# Patient Record
Sex: MALE | Race: WHITE | Employment: FULL TIME | URBAN - METROPOLITAN AREA
[De-identification: names, ages, dates, MRNs, and addresses within clinical notes are randomized per-mention and may not be internally consistent; named-entity substitution may affect disease eponyms.]

---

## 2020-03-06 ENCOUNTER — HOSPITAL ENCOUNTER (OUTPATIENT)
Dept: PREADMISSION TESTING | Age: 40
Discharge: HOME | End: 2020-03-10
Payer: COMMERCIAL

## 2020-03-06 VITALS
TEMPERATURE: 98.2 F | HEIGHT: 71 IN | HEART RATE: 68 BPM | BODY MASS INDEX: 26.42 KG/M2 | DIASTOLIC BLOOD PRESSURE: 72 MMHG | WEIGHT: 188.71 LBS | SYSTOLIC BLOOD PRESSURE: 130 MMHG | RESPIRATION RATE: 18 BRPM | OXYGEN SATURATION: 98 %

## 2020-03-06 RX ORDER — OXYCODONE HCL 10 MG/1
5 TABLET, FILM COATED, EXTENDED RELEASE ORAL EVERY 4 HOURS PRN
COMMUNITY

## 2020-03-06 RX ORDER — SODIUM CHLORIDE, SODIUM LACTATE, POTASSIUM CHLORIDE, CALCIUM CHLORIDE 600; 310; 30; 20 MG/100ML; MG/100ML; MG/100ML; MG/100ML
1000 INJECTION, SOLUTION INTRAVENOUS CONTINUOUS
Status: CANCELLED | OUTPATIENT
Start: 2020-03-06

## 2020-03-06 ASSESSMENT — PAIN DESCRIPTION - ORIENTATION: ORIENTATION: LEFT

## 2020-03-06 ASSESSMENT — PAIN DESCRIPTION - FREQUENCY: FREQUENCY: CONTINUOUS

## 2020-03-06 ASSESSMENT — PAIN DESCRIPTION - PAIN TYPE: TYPE: CHRONIC PAIN

## 2020-03-06 ASSESSMENT — PAIN SCALES - GENERAL: PAINLEVEL_OUTOF10: 6

## 2020-03-06 ASSESSMENT — PAIN DESCRIPTION - LOCATION: LOCATION: SHOULDER

## 2020-03-06 NOTE — H&P
History and Physical    Pt Name: Lyndsay Strong  MRN: [id number]  YOB: 1980  Date of evaluation: 3/6/2020    SUBJECTIVE:     History of Chief Complaint:    Patient complains of neck, left shoulder and left arm pain for about four months. He denies a specific injury to his neck but reports he slept wrong on the couch the night before the pain started. He reports his insurance will not cover physical therapy. He has been scheduled for anterior cervical decompression with fusion. Past Medical History    has a past medical history of Arthritis, Headache, Herniated disc, cervical, Pain, Snores, Wears glasses, and Wellness examination. Past Surgical History   has a past surgical history that includes lipoma resection (N/A, 2013). Medications    Current Outpatient Medications:     oxyCODONE (OXYCONTIN) 10 MG extended release tablet, Take 5 mg by mouth every 4 hours as needed for Pain., Disp: , Rfl:     NONFORMULARY, Take 1 tablet by mouth every 3 hours as needed Elderberry, Disp: , Rfl:     ibuprofen (ADVIL;MOTRIN) 800 MG tablet, Take 1 tablet by mouth every 8 hours as needed for Pain (with food). , Disp: 90 tablet, Rfl: 0  Allergies  is allergic to naproxen. Family History  family history includes Heart Disease in his father. Social History   reports that he has been smoking cigarettes. He has been smoking about 0.75 packs per day. He has never used smokeless tobacco.   reports current alcohol use of about 12.0 standard drinks of alcohol per week. reports current drug use. Drug: Marijuana.     Marital Status: engaged  Children: five children  Occupation:     Review of Systems  CONSTITUTIONAL:  negative   EYES:  glasses  HENT:  negative   RESPIRATORY:  cough with sputum  CARDIOVASCULAR:  negative   GASTROINTESTINAL:  negative   GENITOURINARY:  negative   INTEGUMENT/BREAST:  negative   HEMATOLOGIC/LYMPHATIC:  negative   ALLERGIC/IMMUNOLOGIC:  drug reactions  ENDOCRINE:  negative MUSCULOSKELETAL:  myalgias, arthralgias and decreased range of motion  NEUROLOGICAL:  weakness, numbness and tingling  BEHAVIOR/PSYCH:  negative     OBJECTIVE:     VITALS:  height is 5' 11\" (1.803 m) and weight is 188 lb 11.4 oz (85.6 kg). His temporal temperature is 98.2 °F (36.8 °C). His blood pressure is 130/72 and his pulse is 68. His respiration is 18 and oxygen saturation is 98%. CONSTITUTIONAL: Alert & oriented x 3, no acute distress. Antalgic gait and posture. SKIN:  Warm and dry, no rash or erythema. HEAD:  Normocephalic, atraumatic. EYES: PERRLA. EOMs intact. Wears glasses. EARS:  Hearing grossly normal.    NOSE:  Nares patent. Septum midline. No rhinorrhea   MOUTH/THROAT:  Unremarkable   NECK: Supple with no lymphadenopathy. LUNGS: Clear to auscultation throughout. No wheezes, rales or rhonchi. CARDIOVASCULAR: Heart rate regular. Rhythm without murmur, click, gallop or rub. ABDOMEN: Soft, non tender, non distended, no masses or organomegaly. EXTREMITIES: No clubbing, cyanosis or edema. TESTING:     Not applicable    IMPRESSION:   1. Cervical radiculopathy  2.  has a past medical history of Arthritis, Headache, Herniated disc, cervical, Pain, Snores, Wears glasses, and Wellness examination. PLAN:   1.  Anterior cervical decompression, fusion C6-7    Georges WALSH, ESTELA-C  Electronically signed 3/6/2020 at 12:58 PM